# Patient Record
Sex: MALE | Race: AMERICAN INDIAN OR ALASKA NATIVE | NOT HISPANIC OR LATINO | Employment: UNEMPLOYED | URBAN - METROPOLITAN AREA
[De-identification: names, ages, dates, MRNs, and addresses within clinical notes are randomized per-mention and may not be internally consistent; named-entity substitution may affect disease eponyms.]

---

## 2017-08-07 ENCOUNTER — HOSPITAL ENCOUNTER (EMERGENCY)
Facility: MEDICAL CENTER | Age: 32
End: 2017-08-07
Attending: EMERGENCY MEDICINE
Payer: OTHER GOVERNMENT

## 2017-08-07 ENCOUNTER — APPOINTMENT (OUTPATIENT)
Dept: RADIOLOGY | Facility: MEDICAL CENTER | Age: 32
End: 2017-08-07
Attending: EMERGENCY MEDICINE
Payer: OTHER GOVERNMENT

## 2017-08-07 VITALS
WEIGHT: 181.88 LBS | OXYGEN SATURATION: 95 % | TEMPERATURE: 99.1 F | BODY MASS INDEX: 25.46 KG/M2 | RESPIRATION RATE: 18 BRPM | DIASTOLIC BLOOD PRESSURE: 77 MMHG | SYSTOLIC BLOOD PRESSURE: 143 MMHG | HEART RATE: 95 BPM | HEIGHT: 71 IN

## 2017-08-07 DIAGNOSIS — M25.561 ACUTE PAIN OF RIGHT KNEE: ICD-10-CM

## 2017-08-07 DIAGNOSIS — M25.40 EFFUSION INTO JOINT: ICD-10-CM

## 2017-08-07 LAB
APPEARANCE FLD: NORMAL
BODY FLD TYPE: NORMAL
COLOR FLD: YELLOW
CSF COMMENTS 1658: NORMAL
GRAM STN SPEC: NORMAL
LYMPHOCYTES NFR FLD: 4 %
MONONUC CELLS NFR FLD: 1 %
NEUTROPHILS NFR FLD: 95 %
RBC # FLD: 2000 CELLS/UL
SIGNIFICANT IND 70042: NORMAL
SITE SITE: NORMAL
SOURCE SOURCE: NORMAL
WBC # FLD: NORMAL CELLS/UL

## 2017-08-07 PROCEDURE — 20610 DRAIN/INJ JOINT/BURSA W/O US: CPT

## 2017-08-07 PROCEDURE — 99284 EMERGENCY DEPT VISIT MOD MDM: CPT

## 2017-08-07 PROCEDURE — 87205 SMEAR GRAM STAIN: CPT

## 2017-08-07 PROCEDURE — 700101 HCHG RX REV CODE 250: Performed by: EMERGENCY MEDICINE

## 2017-08-07 PROCEDURE — 73562 X-RAY EXAM OF KNEE 3: CPT | Mod: RT

## 2017-08-07 PROCEDURE — 89051 BODY FLUID CELL COUNT: CPT

## 2017-08-07 PROCEDURE — 87070 CULTURE OTHR SPECIMN AEROBIC: CPT

## 2017-08-07 RX ORDER — OXYCODONE HYDROCHLORIDE AND ACETAMINOPHEN 5; 325 MG/1; MG/1
1-2 TABLET ORAL EVERY 6 HOURS PRN
Qty: 10 TAB | Refills: 0 | Status: SHIPPED | OUTPATIENT
Start: 2017-08-07

## 2017-08-07 RX ORDER — LIDOCAINE HYDROCHLORIDE AND EPINEPHRINE BITARTRATE 20; .01 MG/ML; MG/ML
10 INJECTION, SOLUTION SUBCUTANEOUS ONCE
Status: COMPLETED | OUTPATIENT
Start: 2017-08-07 | End: 2017-08-07

## 2017-08-07 RX ADMIN — LIDOCAINE HYDROCHLORIDE AND EPINEPHRINE 10 ML: 20; 10 INJECTION, SOLUTION INFILTRATION; PERINEURAL at 14:00

## 2017-08-07 ASSESSMENT — PAIN SCALES - GENERAL: PAINLEVEL_OUTOF10: 10

## 2017-08-07 NOTE — ED AVS SNAPSHOT
Home Care Instructions                                                                                                                Haim NUNO [place]   MRN: [id number]    Department:  [facility], Emergency Dept   Date of Visit:  8/7/2017            [facility], Emergency Dept    [address]    [place] [address]    Phone:  [phone]      You were seen by     Jared Silva M.D.      Your Diagnosis Was     Acute pain of right knee     M25.561       Follow-up Information     1. Follow up with Judah Craig M.D.. Schedule an appointment as soon as possible for a visit in 1 week.    Specialty:  Orthopaedics    Contact information    [address]  [address]  [facility] [address]  [phone]        Medication Information     Review all of your home medications and newly ordered medications with your primary doctor and/or pharmacist as soon as possible. Follow medication instructions as directed by your doctor and/or pharmacist.     Please keep your complete medication list with you and share with your physician. Update the information when medications are discontinued, doses are changed, or new medications (including over-the-counter products) are added; and carry medication information at all times in the event of emergency situations.               Medication List      START taking these medications        Instructions    Morning Afternoon Evening Bedtime    oxycodone-acetaminophen 5-325 MG Tabs   Commonly known as:  PERCOCET        Take 1-2 Tabs by mouth every 6 hours as needed (moderate to severe pain).   Dose:  1-2 Tab                             Where to Get Your Medications      You can get these medications from any pharmacy     Bring a paper prescription for each of these medications    - oxycodone-acetaminophen 5-325 MG Tabs            Procedures and tests performed during your visit     Procedure/Test Number of Times Performed    CONSENT FOR NON-SURGERY W/O SED 1    DX-KNEE 3 VIEWS RIGHT 1    NURSING COMMUNICATION 2    ORTHOPEDIC TECHNICIAN ASSISTANCE 1        Discharge Instructions       Knee Effusion  You probably have a cruciate ligament injury or a meniscus injury. Rest and ice the knee. Wear knee immobilizer and use crutches. Take ibuprofen and Percocet for pain. Follow-up with Dr. Craig within the week.    Knee effusion means that you have excess fluid in your knee joint. This can cause pain and swelling in your knee. This may make your knee more difficult to bend and move. That is because there is increased pain and pressure in the joint. If there is fluid in your knee, it often means that something is wrong inside your knee, such as severe arthritis, abnormal inflammation, or an infection. Another common cause of knee effusion is an injury to the knee muscles, ligaments, or cartilage.  HOME CARE INSTRUCTIONS  · Use crutches as directed by your health care provider.  · Wear a knee brace as directed by your health care provider.  · Apply ice to the swollen area:  ¨ Put ice in a plastic bag.  ¨ Place a towel between your skin and the bag.  ¨ Leave the ice on for 20 minutes, 2-3 times per day.  · Keep your knee raised (elevated) when you are sitting or lying down.  · Take medicines only as directed by your health care provider.  · Do any rehabilitation or strengthening exercises as directed by your health care provider.  · Rest your knee as directed by your health care provider. You may start doing your normal activities again when your health care provider approves.     · Keep all follow-up visits as directed by your health care provider. This is important.  SEEK MEDICAL CARE IF:  · You have ongoing (persistent) pain in your knee.  SEEK IMMEDIATE MEDICAL CARE IF:  · You have increased swelling or redness of your knee.  · You have severe pain in your knee.  · You have a fever.     This information is not intended to replace advice given to you by your health care provider.  Make sure you discuss any questions you have with your health care provider.     Document Released: 03/09/2005 Document Revised: 01/08/2016 Document Reviewed: 08/03/2015  Elsevier Interactive Patient Education ©2016 Elsevier Inc.            Patient Information     Patient Information    Following emergency treatment: all patient requiring follow-up care must return either to a private physician or a clinic if your condition worsens before you are able to obtain further medical attention, please return to the emergency room.     Billing Information    At [facility], we work to make the billing process streamlined for our patients.  Our Representatives are here to answer any questions you may have regarding your hospital bill.  If you have insurance coverage and have supplied your insurance information to us, we will submit a claim to your insurer on your behalf.  Should you have any questions regarding your bill, we can be reached online or by phone as follows:  Online: You are able pay your bills online or live chat with our representatives about any billing questions you may have. We are here to help Monday - Friday from 8:00am to 7:30pm and 9:00am - 12:00pm on Saturdays.  Please visit [email or web address]  for more information.   Phone:  [phone] or [phone]    Please note that your emergency physician, surgeon, pathologist, radiologist, anesthesiologist, and other specialists are not employed by [facility] and will therefore bill separately for their services.  Please contact them directly for any questions concerning their bills at the numbers below:     Emergency Physician Services:  [phone]  [facility]:  [phone]  [facility]:  [phone]  [facility]:  [phone]    1. Your final bill may vary from the amount quoted upon discharge if all procedures are not complete at that time, or if your doctor has  additional procedures of which we are not aware. You will receive an additional bill if you return to the Emergency Department at [facility] for suture removal regardless of the facility of which the sutures were placed.     2. Please arrange for settlement of this account at the emergency registration.    3. All self-pay accounts are due in full at the time of treatment.  If you are unable to meet this obligation then payment is expected within 4-5 days.     4. If you have had radiology studies (CT, X-ray, Ultrasound, MRI), you have received a preliminary result during your emergency department visit. Please contact the radiology department [phone] to receive a copy of your final result. Please discuss the Final result with your primary physician or with the follow up physician provided.     Crisis Hotline:  [place] Crisis Hotline:  1-800-ZAIMIFY or 1-536.346.9327  [place] Crisis Hotline:    1-569.538.1088 or 199-993-9563         ED Discharge Follow Up Questions    1. In order to provide you with very good care, we would like to follow up with a phone call in the next few days.  May we have your permission to contact you?     YES /  NO    2. What is the best phone number to call you? (       )_____-__________    3. What is the best time to call you?      Morning  /  Afternoon  /  Evening                   Patient Signature:  ____________________________________________________________    Date:  ____________________________________________________________

## 2017-08-07 NOTE — ED NOTES
Pt to triage in wheelchair  Chief Complaint   Patient presents with   • Knee Swelling     R knee    states he twisted knee coming down stairs 3-4 days ago  Pt asked to wait in lobby, pt updated on triage process and pt asked to inform RN of any changes.

## 2017-08-07 NOTE — DISCHARGE INSTRUCTIONS
Knee Effusion  You probably have a cruciate ligament injury or a meniscus injury. Rest and ice the knee. Wear knee immobilizer and use crutches. Take ibuprofen and Percocet for pain. Follow-up with Dr. Craig within the week.    Knee effusion means that you have excess fluid in your knee joint. This can cause pain and swelling in your knee. This may make your knee more difficult to bend and move. That is because there is increased pain and pressure in the joint. If there is fluid in your knee, it often means that something is wrong inside your knee, such as severe arthritis, abnormal inflammation, or an infection. Another common cause of knee effusion is an injury to the knee muscles, ligaments, or cartilage.  HOME CARE INSTRUCTIONS  · Use crutches as directed by your health care provider.  · Wear a knee brace as directed by your health care provider.  · Apply ice to the swollen area:  ¨ Put ice in a plastic bag.  ¨ Place a towel between your skin and the bag.  ¨ Leave the ice on for 20 minutes, 2-3 times per day.  · Keep your knee raised (elevated) when you are sitting or lying down.  · Take medicines only as directed by your health care provider.  · Do any rehabilitation or strengthening exercises as directed by your health care provider.  · Rest your knee as directed by your health care provider. You may start doing your normal activities again when your health care provider approves.     · Keep all follow-up visits as directed by your health care provider. This is important.  SEEK MEDICAL CARE IF:  · You have ongoing (persistent) pain in your knee.  SEEK IMMEDIATE MEDICAL CARE IF:  · You have increased swelling or redness of your knee.  · You have severe pain in your knee.  · You have a fever.     This information is not intended to replace advice given to you by your health care provider. Make sure you discuss any questions you have with your health care provider.     Document Released: 03/09/2005 Document  Revised: 01/08/2016 Document Reviewed: 08/03/2015  Elsevier Interactive Patient Education ©2016 Elsevier Inc.

## 2017-08-07 NOTE — ED AVS SNAPSHOT
Neurodyn Access Code: [email or web address]  Expires: 9/6/2017  1:44 PM    Neurodyn  A secure, online tool to manage your health information     [employer]’s Neurodyn® is a secure, online tool that connects you to your personalized health information from the privacy of your home -- day or night - making it very easy for you to manage your healthcare. Once the activation process is completed, you can even access your medical information using the Neurodyn guanaco, which is available for free in the Apple Guanaco store or Google Play store.     Neurodyn provides the following levels of access (as shown below):   My Chart Features   [facility] Primary Care Doctor [facility]  Specialists [facility]  Urgent  Care Non-[facility]  Primary Care  Doctor   Email your healthcare team securely and privately 24/7 X X X X   Manage appointments: schedule your next appointment; view details of past/upcoming appointments X      Request prescription refills. X      View recent personal medical records, including lab and immunizations X X X X   View health record, including health history, allergies, medications X X X X   Read reports about your outpatient visits, procedures, consult and ER notes X X X X   See your discharge summary, which is a recap of your hospital and/or ER visit that includes your diagnosis, lab results, and care plan. X X       How to register for Neurodyn:  1. Go to  [email or web address].  2. Click on the Sign Up Now box, which takes you to the New Member Sign Up page. You will need to provide the following information:  a. Enter your Neurodyn Access Code exactly as it appears at the top of this page. (You will not need to use this code after you’ve completed the sign-up process. If you do not sign up before the expiration date, you must request a new code.)   b. Enter your date of birth.   c. Enter your home email address.   d. Click Submit, and follow the next screen’s instructions.  3. Create a Neurodyn ID. This will be your Neurodyn  login ID and cannot be changed, so think of one that is secure and easy to remember.  4. Create a Reedsy password. You can change your password at any time.  5. Enter your Password Reset Question and Answer. This can be used at a later time if you forget your password.   6. Enter your e-mail address. This allows you to receive e-mail notifications when new information is available in Reedsy.  7. Click Sign Up. You can now view your health information.    For assistance activating your Reedsy account, call [phone]

## 2017-08-07 NOTE — ED PROVIDER NOTES
"ED Provider Note    CHIEF COMPLAINT   Chief Complaint   Patient presents with   • Knee Swelling     R knee        HPI   Haim Holl[place] is a 31 y.o. male who presents with severe right knee pain and swelling and decreased range of motion since a twist injury walking on the stairs 3 days ago. No prior injury.    REVIEW OF SYSTEMS   Pertinent positives: Right knee pain swelling and decreased range of motion.  Pertinent negatives: Fever, redness.    PAST MEDICAL HISTORY   Denies    CURRENT MEDICATIONS   None.    ALLERGIES   No Known Allergies    PHYSICAL EXAM  VITAL SIGNS: /77 mmHg  Pulse 95  Temp(Src) 37.3 °C (99.1 °F) (Temporal)  Resp 18  Ht 1.803 m (5' 11\")  Wt 82.5 kg (181 lb 14.1 oz)  BMI 25.38 kg/m2  SpO2 95%  Constitutional: Well developed, Well nourished, elevated blood pressure.  HENT: Atraumatic.   Cardiovascular:  Peripheral pulses sounds pedis 2+.  Skin: No erythema or warmth.   Musculoskeletal: Large right knee effusion and patient refuses flexion and extension. Held in 15° of flexion. Radial and lateral joint line tenderness. No deformity  Compartments: Soft  Neurologic: Digit flexion-extension and sensation normal    RADIOLOGY/PROCEDURES  DX-KNEE 3 VIEWS RIGHT   Final Result      1.  There is a moderate right knee joint effusion with superficial swelling. There is no fracture or dislocation.        Case discussed with Dr. Craig    COURSE & MEDICAL DECISION MAKING  Seizure: Therapeutic arthrocentesis. Informed consent obtained. Knee prepped with povidone. Sterile gloves and mask utilized. Sterile drape placed. Skin anesthetized with 1-1/2 mL 1% lidocaine with epinephrine. 60 mL of thick yellow synovial fluid removed. Standard sterile technique employed. Patient tolerated procedure well. Cell count and culture pending.    This patient presents with a painful posttraumatic effusion and likely has a meniscus injury versus an ACL injury. On ligament stress testing there is no evidence of " collateral ligament, posterior cruciate injury or definite ACL injury.    PLAN:  Knee immobilizer  Crutches  [place]  Hydrocodone  Prescription monitoring accessed  Follow-up Dr. Craig    CONDITION: good    FINAL IMPRESSION  1. Acute pain of right knee    2. Effusion into joint            Electronically signed by: Jared Silva, 8/7/2017 11:15 AM

## 2017-08-07 NOTE — ED NOTES
Knee immobilizer applied, crutch instruction provided. PT demonstrates use. Verbalizes understanding of percocet prescription and follow up. Denies questions/needs at this time.

## 2017-08-07 NOTE — ED AVS SNAPSHOT
8/7/2017    Haim NUNO [place]  [address] [place] [address]    Dear Haim:    [facility] wants to ensure your discharge home is safe and you or your loved ones have had all of your questions answered regarding your care after you leave the hospital.    Below is a list of resources and contact information should you have any questions regarding your hospital stay, follow-up instructions, or active medical symptoms.    Questions or Concerns Regarding… Contact   Medical Questions Related to Your Discharge  (7 days a week, 8am-5pm) Contact a Nurse Care Coordinator   [phone]   Medical Questions Not Related to Your Discharge  (24 hours a day / 7 days a week)  Contact the Nurse Health Line   [phone]    Medications or Discharge Instructions Refer to your discharge packet   or contact your [facility] Primary Care Provider   [phone]   Follow-up Appointment(s) Schedule your appointment via Shotfarm   or contact Scheduling [phone]   Billing Review your statement via Shotfarm  or contact Billing [phone]   Medical Records Review your records via Shotfarm   or contact Medical Records [phone]     You may receive a telephone call within two days of discharge. This call is to make certain you understand your discharge instructions and have the opportunity to have any questions answered. You can also easily access your medical information, test results and upcoming appointments via the Shotfarm free online health management tool. You can learn more and sign up at [email or web address]. For assistance setting up your Shotfarm account, please call [phone].    Once again, we want to ensure your discharge home is safe and that you have a clear understanding of any next steps in your care. If you have any questions or concerns, please do not hesitate to contact us, we are here for you. Thank you for choosing [facility] for your healthcare needs.    Sincerely,    Your [facility] Healthcare Team

## 2017-08-11 LAB
BACTERIA FLD AEROBE CULT: NORMAL
GRAM STN SPEC: NORMAL
SIGNIFICANT IND 70042: NORMAL
SITE SITE: NORMAL
SOURCE SOURCE: NORMAL

## 2017-08-28 ENCOUNTER — APPOINTMENT (OUTPATIENT)
Dept: RADIOLOGY | Facility: MEDICAL CENTER | Age: 32
End: 2017-08-28
Attending: EMERGENCY MEDICINE
Payer: OTHER GOVERNMENT

## 2017-08-28 ENCOUNTER — HOSPITAL ENCOUNTER (EMERGENCY)
Facility: MEDICAL CENTER | Age: 32
End: 2017-08-28
Attending: EMERGENCY MEDICINE
Payer: OTHER GOVERNMENT

## 2017-08-28 VITALS
RESPIRATION RATE: 20 BRPM | WEIGHT: 181 LBS | DIASTOLIC BLOOD PRESSURE: 90 MMHG | BODY MASS INDEX: 24.52 KG/M2 | HEIGHT: 72 IN | OXYGEN SATURATION: 95 % | TEMPERATURE: 96.3 F | SYSTOLIC BLOOD PRESSURE: 127 MMHG | HEART RATE: 94 BPM

## 2017-08-28 DIAGNOSIS — L03.115 CELLULITIS OF RIGHT LOWER EXTREMITY: ICD-10-CM

## 2017-08-28 DIAGNOSIS — S89.91XA KNEE INJURY, RIGHT, INITIAL ENCOUNTER: ICD-10-CM

## 2017-08-28 DIAGNOSIS — M71.21 BAKER'S CYST, RIGHT: ICD-10-CM

## 2017-08-28 DIAGNOSIS — F10.10 ALCOHOL ABUSE: ICD-10-CM

## 2017-08-28 LAB
ANION GAP SERPL CALC-SCNC: 10 MMOL/L (ref 0–11.9)
BASOPHILS # BLD AUTO: 0.4 % (ref 0–1.8)
BASOPHILS # BLD: 0.02 K/UL (ref 0–0.12)
BUN SERPL-MCNC: 5 MG/DL (ref 8–22)
CALCIUM SERPL-MCNC: 8.6 MG/DL (ref 8.5–10.5)
CHLORIDE SERPL-SCNC: 109 MMOL/L (ref 96–112)
CO2 SERPL-SCNC: 23 MMOL/L (ref 20–33)
CREAT SERPL-MCNC: 0.52 MG/DL (ref 0.5–1.4)
CRP SERPL HS-MCNC: 4.38 MG/DL (ref 0–0.75)
EOSINOPHIL # BLD AUTO: 0.24 K/UL (ref 0–0.51)
EOSINOPHIL NFR BLD: 4.2 % (ref 0–6.9)
ERYTHROCYTE [DISTWIDTH] IN BLOOD BY AUTOMATED COUNT: 44.8 FL (ref 35.9–50)
GFR SERPL CREATININE-BSD FRML MDRD: >60 ML/MIN/1.73 M 2
GLUCOSE SERPL-MCNC: 88 MG/DL (ref 65–99)
HCT VFR BLD AUTO: 43.4 % (ref 42–52)
HGB BLD-MCNC: 14.5 G/DL (ref 14–18)
IMM GRANULOCYTES # BLD AUTO: 0.01 K/UL (ref 0–0.11)
IMM GRANULOCYTES NFR BLD AUTO: 0.2 % (ref 0–0.9)
LYMPHOCYTES # BLD AUTO: 1.64 K/UL (ref 1–4.8)
LYMPHOCYTES NFR BLD: 28.8 % (ref 22–41)
MCH RBC QN AUTO: 28.8 PG (ref 27–33)
MCHC RBC AUTO-ENTMCNC: 33.4 G/DL (ref 33.7–35.3)
MCV RBC AUTO: 86.3 FL (ref 81.4–97.8)
MONOCYTES # BLD AUTO: 0.44 K/UL (ref 0–0.85)
MONOCYTES NFR BLD AUTO: 7.7 % (ref 0–13.4)
NEUTROPHILS # BLD AUTO: 3.34 K/UL (ref 1.82–7.42)
NEUTROPHILS NFR BLD: 58.7 % (ref 44–72)
NRBC # BLD AUTO: 0 K/UL
NRBC BLD AUTO-RTO: 0 /100 WBC
PLATELET # BLD AUTO: 368 K/UL (ref 164–446)
PMV BLD AUTO: 9.5 FL (ref 9–12.9)
POTASSIUM SERPL-SCNC: 2.9 MMOL/L (ref 3.6–5.5)
RBC # BLD AUTO: 5.03 M/UL (ref 4.7–6.1)
SODIUM SERPL-SCNC: 142 MMOL/L (ref 135–145)
WBC # BLD AUTO: 5.7 K/UL (ref 4.8–10.8)

## 2017-08-28 PROCEDURE — 73590 X-RAY EXAM OF LOWER LEG: CPT | Mod: RT

## 2017-08-28 PROCEDURE — 86140 C-REACTIVE PROTEIN: CPT

## 2017-08-28 PROCEDURE — 73564 X-RAY EXAM KNEE 4 OR MORE: CPT | Mod: RT

## 2017-08-28 PROCEDURE — 85025 COMPLETE CBC W/AUTO DIFF WBC: CPT

## 2017-08-28 PROCEDURE — 99283 EMERGENCY DEPT VISIT LOW MDM: CPT

## 2017-08-28 PROCEDURE — 80048 BASIC METABOLIC PNL TOTAL CA: CPT

## 2017-08-28 PROCEDURE — A9270 NON-COVERED ITEM OR SERVICE: HCPCS | Performed by: EMERGENCY MEDICINE

## 2017-08-28 PROCEDURE — 93971 EXTREMITY STUDY: CPT

## 2017-08-28 PROCEDURE — 700102 HCHG RX REV CODE 250 W/ 637 OVERRIDE(OP): Performed by: EMERGENCY MEDICINE

## 2017-08-28 RX ORDER — IBUPROFEN 600 MG/1
600 TABLET ORAL ONCE
Status: COMPLETED | OUTPATIENT
Start: 2017-08-28 | End: 2017-08-28

## 2017-08-28 RX ORDER — CEPHALEXIN 500 MG/1
500 CAPSULE ORAL 4 TIMES DAILY
Qty: 28 CAP | Refills: 0 | Status: SHIPPED | OUTPATIENT
Start: 2017-08-28 | End: 2017-09-04

## 2017-08-28 RX ADMIN — IBUPROFEN 600 MG: 600 TABLET, FILM COATED ORAL at 06:42

## 2017-08-28 ASSESSMENT — LIFESTYLE VARIABLES
EVER HAD A DRINK FIRST THING IN THE MORNING TO STEADY YOUR NERVES TO GET RID OF A HANGOVER: NO
HAVE YOU EVER FELT YOU SHOULD CUT DOWN ON YOUR DRINKING: NO
TOTAL SCORE: 0
HAVE PEOPLE ANNOYED YOU BY CRITICIZING YOUR DRINKING: NO
TOTAL SCORE: 0
TOTAL SCORE: 0
EVER FELT BAD OR GUILTY ABOUT YOUR DRINKING: NO
CONSUMPTION TOTAL: INCOMPLETE
DO YOU DRINK ALCOHOL: YES

## 2017-08-28 ASSESSMENT — PAIN SCALES - GENERAL: PAINLEVEL_OUTOF10: 10

## 2017-08-28 NOTE — ED NOTES
Lab called and are backed up and will be placing patient's labs in process as soon as possible and ERP updated.

## 2017-08-28 NOTE — ED NOTES
Patient given discharge paperwork and verbalized understanding. Patient out of ED ambulatory with police. Patient in stable condition and vitals stable and no distress noted.

## 2017-08-28 NOTE — ED NOTES
Assumed care of patient. Patient complains of right leg pain x days and patient got arrested tonight so wanted to come to ED. Patient claims that if he wasn't arrested tonight he was going to see his Ortho MD on Tuesday but due to being arrested he won't be able to go.  Patient alert and oriented x 4. Patient denies any other complaints at this time. Patient side rails up x 2 and call bell within reach. Patient in custody with Mariam WARD.

## 2017-08-28 NOTE — ED PROVIDER NOTES
ED Provider Note      CHIEF COMPLAINT   Chief Complaint   Patient presents with   • Leg Pain     Patient complains of right leg pain after being arrested tonight with redness and swelling noted.       DOROTA Holliday is a 31 y.o. male who presentsTo the emergency department over the right leg pain and swelling. Patient was seen on 8/7 with knee pain after twisting it. He had a large effusion. Effusion was drained. On review of the chart culture negative. Orthopedics was consulted and the patient was advised follow-up. He has not followed up because of insurance, but says he was told that he needed to come to the ER if his leg continued to hurt. Since then his whole leg swelled up. He doesn't recall any new injury. He has not had a fever. Has not noticed any skin rash. Tonight he was being arrested and complained of the leg pain. He was brought into the ER for medical clearance.    REVIEW OF SYSTEMS   Pertinent negative: No fever, new trauma, injection drug use    PAST MEDICAL HISTORY   Past Medical History:   Diagnosis Date   • Abscess        SOCIAL HISTORY  Social History   Substance Use Topics   • Smoking status: Current Some Day Smoker   • Smokeless tobacco: Never Used   • Alcohol use Yes      Comment: Whiskey more than 2x/wk       ALLERGIES   See chart    PHYSICAL EXAM  VITAL SIGNS: /90   Pulse 94   Temp (!) 35.7 °C (96.3 °F)   Resp 20   Ht 1.829 m (6')   Wt 82.1 kg (181 lb)   SpO2 95%   BMI 24.55 kg/m²   Head: Atraumatic  Eyes: Eyes normal inspection  Neck: has full range of motion, normal inspection.  Constitutional: No acute distress   Cardiovascular: Normal heart rate. PulsesStrong dorsalis pedis  Thorax & Lungs: No respiratory distress  Skin: There is mild redness over the right lower leg above the sock line  Musculoskeletal: There is edema of the right lower extremity from the knee down. There is a right knee effusion. No pain with range of motion the limited plane, but full extension  and significant flexion results in discomfort. He resists assessment of ligaments because of pain. The entire right lower extremity is swollen. There is no significant warmth. He does not have any palpable cords. Compartments soft.  Neurologic:  Normal sensory and motor    RADIOLOGY/PROCEDURES  LE VENOUS DUPLEX (Specify in Comments Left, Right Or Bilateral)         DX-TIBIA AND FIBULA RIGHT   Final Result      No acute fracture is identified.      DX-KNEE COMPLETE 4+ RIGHT   Final Result      1.  No acute fracture is identified.      2.  Small knee effusion, decreased from the prior exam         Imaging is interpreted by radiologist    Venous duplex interpreted as negative for DVT, but fluid collection in the popliteal region was noted consistent with Baker cyst      Results for orders placed or performed during the hospital encounter of 08/28/17   CBC WITH DIFFERENTIAL   Result Value Ref Range    WBC 5.7 4.8 - 10.8 K/uL    RBC 5.03 4.70 - 6.10 M/uL    Hemoglobin 14.5 14.0 - 18.0 g/dL    Hematocrit 43.4 42.0 - 52.0 %    MCV 86.3 81.4 - 97.8 fL    MCH 28.8 27.0 - 33.0 pg    MCHC 33.4 (L) 33.7 - 35.3 g/dL    RDW 44.8 35.9 - 50.0 fL    Platelet Count 368 164 - 446 K/uL    MPV 9.5 9.0 - 12.9 fL    Neutrophils-Polys 58.70 44.00 - 72.00 %    Lymphocytes 28.80 22.00 - 41.00 %    Monocytes 7.70 0.00 - 13.40 %    Eosinophils 4.20 0.00 - 6.90 %    Basophils 0.40 0.00 - 1.80 %    Immature Granulocytes 0.20 0.00 - 0.90 %    Nucleated RBC 0.00 /100 WBC    Neutrophils (Absolute) 3.34 1.82 - 7.42 K/uL    Lymphs (Absolute) 1.64 1.00 - 4.80 K/uL    Monos (Absolute) 0.44 0.00 - 0.85 K/uL    Eos (Absolute) 0.24 0.00 - 0.51 K/uL    Baso (Absolute) 0.02 0.00 - 0.12 K/uL    Immature Granulocytes (abs) 0.01 0.00 - 0.11 K/uL    NRBC (Absolute) 0.00 K/uL   CRP QUANTITIVE (NON-CARDIAC)   Result Value Ref Range    Stat C-Reactive Protein 4.38 (H) 0.00 - 0.75 mg/dL   BMP   Result Value Ref Range    Sodium 142 135 - 145 mmol/L    Potassium 2.9  (L) 3.6 - 5.5 mmol/L    Chloride 109 96 - 112 mmol/L    Co2 23 20 - 33 mmol/L    Glucose 88 65 - 99 mg/dL    Bun 5 (L) 8 - 22 mg/dL    Creatinine 0.52 0.50 - 1.40 mg/dL    Calcium 8.6 8.5 - 10.5 mg/dL    Anion Gap 10.0 0.0 - 11.9   ESTIMATED GFR   Result Value Ref Range    GFR If African American >60 >60 mL/min/1.73 m 2    GFR If Non African American >60 >60 mL/min/1.73 m 2         COURSE & MEDICAL DECISION MAKING  Patient presents to the ER with right lower extremity pain. This is an ongoing issue. He sustained a knee injury previously that he has not had addressed. His whole right leg is swollen. He does have some scant redness. He does not have an exam consistent with septic arthritis. Further he had arthrocentesis a few weeks ago that was negative. I obtained repeat x-rays which were negative. Ultrasound of the right lower extremity was also negative for DVT. I obtained laboratory data without leukocytosis, but he does have elevation of the CRP. Given the redness of the skin I will cover him for possible cellulitis. He is prescribed Keflex. His potassium was low. Given instructions regarding potassium replenishment. I have advised that he follow-up at the orthopedic clinic as soon as possible. He should return to the ER if he has fevers, worsening symptoms, not improving or concern.    Patient advised to see a primary provider for blood pressure management    FINAL IMPRESSION  1. Internal derangement with associated effusion, right knee  2. Right lower extremity edema, suspected ruptured Baker's cyst versus cyst  3. Possible cellulitis right lower extremity  4. Hypokalemia      This dictation was created using voice recognition software. The accuracy of the dictation is limited to the abilities of the software. I expect there may be some errors of grammar and possibly content. The nursing notes were reviewed and certain aspects of this information were incorporated into this note.      Electronically signed by:  Alex Schulte, 8/28/2017 11:37 AM

## 2017-09-25 ENCOUNTER — HOSPITAL ENCOUNTER (EMERGENCY)
Facility: MEDICAL CENTER | Age: 32
End: 2017-09-25
Attending: EMERGENCY MEDICINE
Payer: MEDICAID

## 2017-09-25 VITALS
DIASTOLIC BLOOD PRESSURE: 84 MMHG | WEIGHT: 194 LBS | HEIGHT: 72 IN | SYSTOLIC BLOOD PRESSURE: 133 MMHG | RESPIRATION RATE: 16 BRPM | BODY MASS INDEX: 26.28 KG/M2 | HEART RATE: 98 BPM | OXYGEN SATURATION: 100 % | TEMPERATURE: 98 F

## 2017-09-25 DIAGNOSIS — H11.31 SUBCONJUNCTIVAL HEMORRHAGE, RIGHT: ICD-10-CM

## 2017-09-25 DIAGNOSIS — S05.01XA CORNEAL ABRASION, RIGHT, INITIAL ENCOUNTER: ICD-10-CM

## 2017-09-25 PROCEDURE — 99283 EMERGENCY DEPT VISIT LOW MDM: CPT

## 2017-09-25 ASSESSMENT — LIFESTYLE VARIABLES
DO YOU DRINK ALCOHOL: YES
CONSUMPTION TOTAL: INCOMPLETE
TOTAL SCORE: 0
EVER FELT BAD OR GUILTY ABOUT YOUR DRINKING: NO
HAVE PEOPLE ANNOYED YOU BY CRITICIZING YOUR DRINKING: NO
HAVE YOU EVER FELT YOU SHOULD CUT DOWN ON YOUR DRINKING: NO
TOTAL SCORE: 0
EVER HAD A DRINK FIRST THING IN THE MORNING TO STEADY YOUR NERVES TO GET RID OF A HANGOVER: NO
TOTAL SCORE: 0

## 2017-09-26 NOTE — ED NOTES
Assumed care of patient. Patient complains of foreign body in right eye x today.  Patient alert and oriented x 4. Patient denies any other complaints at this time. Call bell within reach.

## 2017-09-26 NOTE — ED NOTES
Haim S Lakota 31 y.o. male   Ambulatory to triage    Chief Complaint   Patient presents with   • Foreign Body in Eye     R eye.  Possible insulation.  Redness noted to R eye.        Pt returned to lobby and educated on triage process.  Advised to notify RN with changes or concerns.

## 2017-09-26 NOTE — ED NOTES
Patient ambulatory from [facility] to [facility]. Steady gait. Chart up for ERP.

## 2017-09-26 NOTE — DISCHARGE INSTRUCTIONS
Use the ointment in the right eye 4 times a day for a week approximately half the length of your pinky nail    Corneal Abrasion  The cornea is the clear covering at the front and center of the eye. When looking at the colored portion of the eye (iris), you are looking through the cornea. This very thin tissue is made up of many layers. The surface layer is a single layer of cells (corneal epithelium) and is one of the most sensitive tissues in the body. If a scratch or injury causes the corneal epithelium to come off, it is called a corneal abrasion. If the injury extends to the tissues below the epithelium, the condition is called a corneal ulcer.  CAUSES   · Scratches.  · Trauma.  · Foreign body in the eye.  Some people have recurrences of abrasions in the area of the original injury even after it has healed (recurrent erosion syndrome). Recurrent erosion syndrome generally improves and goes away with time.  SYMPTOMS   · Eye pain.  · Difficulty or inability to keep the injured eye open.  · The eye becomes very sensitive to light.  · Recurrent erosions tend to happen suddenly, first thing in the morning, usually after waking up and opening the eye.  DIAGNOSIS   Your health care provider can diagnose a corneal abrasion during an eye exam. Dye is usually placed in the eye using a drop or a small paper strip moistened by your tears. When the eye is examined with a special light, the abrasion shows up clearly because of the dye.  TREATMENT   · Small abrasions may be treated with antibiotic drops or ointment alone.  · A pressure patch may be put over the eye. If this is done, follow your doctor's instructions for when to remove the patch. Do not drive or use machines while the eye patch is on. Judging distances is hard to do with a patch on.  If the abrasion becomes infected and spreads to the deeper tissues of the cornea, a corneal ulcer can result. This is serious because it can cause corneal scarring. Corneal scars  interfere with light passing through the cornea and cause a loss of vision in the involved eye.  HOME CARE INSTRUCTIONS  · Use medicine or ointment as directed. Only take over-the-counter or prescription medicines for pain, discomfort, or fever as directed by your health care provider.  · Do not drive or operate machinery if your eye is patched. Your ability to  distances is impaired.  · If your health care provider has given you a follow-up appointment, it is very important to keep that appointment. Not keeping the appointment could result in a severe eye infection or permanent loss of vision. If there is any problem keeping the appointment, let your health care provider know.  SEEK MEDICAL CARE IF:   · You have pain, light sensitivity, and a scratchy feeling in one eye or both eyes.  · Your pressure patch keeps loosening up, and you can blink your eye under the patch after treatment.  · Any kind of discharge develops from the eye after treatment or if the lids stick together in the morning.  · You have the same symptoms in the morning as you did with the original abrasion days, weeks, or months after the abrasion healed.  MAKE SURE YOU:   · Understand these instructions.  · Will watch your condition.  · Will get help right away if you are not doing well or get worse.     This information is not intended to replace advice given to you by your health care provider. Make sure you discuss any questions you have with your health care provider.     Document Released: 12/15/2001 Document Revised: 12/23/2014 Document Reviewed: 08/25/2014  [employer] Interactive Patient Education ©2016 [employer]

## 2017-09-26 NOTE — ED PROVIDER NOTES
ED Provider Note    CHIEF COMPLAINT  Chief Complaint   Patient presents with   • Foreign Body in Eye     R eye.  Possible insulation.  Redness noted to R eye.       HPI  Haim Holliday is a 31 y.o. male who presents To the emergency department with chief complaint of right-sided eye irritation and discomfort and blurry vision over the last 36 hours. 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 He does have this itching type feeling however he did notice blood on the side of his eye as well this morning it seemed to some lightly worsened throughout the day, he does endorse these been sneezing quite a lot secondary to working with insulation. He is otherwise healthy he is not on any blood thinning medications has no history of liver dysfunction used to drink quite frequently but quit or backed off several years ago. Otherwise denies headache nausea vomiting or trauma to the eye    REVIEW OF SYSTEMS  See HPI for further details. All other systems are negative.     PAST MEDICAL HISTORY   has a past medical history of Abscess.    SOCIAL HISTORY  Social History     Social History Main Topics   • Smoking status: Current Some Day Smoker   • Smokeless tobacco: Never Used   • Alcohol use Yes      Comment: Whiskey more than 2x/wk   • Drug use: No   • Sexual activity: Not on file       SURGICAL HISTORY  patient denies any surgical history    CURRENT MEDICATIONS  Home Medications    **Home medications have not yet been reviewed for this encounter**         ALLERGIES  No Known Allergies    PHYSICAL EXAM  VITAL SIGNS: /84   Pulse 98   Temp 36.7 °C (98 °F) (Temporal)   Resp 16   Ht 1.829 m (6')   Wt 88 kg (194 lb 0.1 oz)   SpO2 100%   BMI 26.31 kg/m²   Pulse ox interpretation: I interpret this pulse ox as normal.  Constitutional: Alert in no apparent distress.  HENT: Normocephalic, Atraumatic  Eyes: PERRL.Subconjunctival hemorrhage on the  right lateral aspect of the right eye very small  Slit lamp examination with fluorescein multiple areas of uptake over the cornea consistent with superficial abrasions no ulcerations, no hypopyon and no hyphema no cell and flare anterior chamber within normal limits otherwise - R eye  Heart: Regular rate and rythm, no murmurs.    Lungs: Clear to auscultation bilaterally. No resp distress, breath sounds equal  Abdomen: Non-tender, non-distended, normal bowel sounds  Skin: Warm, Dry, No erythema, No rash.   Neurologic: Alert and oriented, Grossly non-focal.       Visual acuity  OS (Left Eye)  20/10          OD (Right Eye)  20/25         OU (Both Eyes)   20/10         COURSE & MEDICAL DECISION MAKING  Pertinent Labs & Imaging studies reviewed. (See chart for details)    Discussed with the patient evidence of subconjunctival hematoma which will improve on its own as well as a larger corneal abrasion to the mid cornea likely secondary to small particle injury without evidence of foreign body or ulceration. The patient's tetanus is up-to-date he was given erythromycin ointment I discussed how to use it 4 times a day over the next week and to follow-up with ophthalmology further evaluation work goggles at work. He understands feels comfortable going home    The patient will return for new or worsening symptoms and is stable at the time of discharge.    The patient is referred to a primary physician for blood pressure management, diabetic screening, and for all other preventative health concerns.    DISPOSITION:  Patient will be discharged home in stable condition.    FOLLOW UP:  Bunny Xiong M.D.  [address]  [address]  [facility] [address]  [phone]    Schedule an appointment as soon as possible for a visit      [facility], Emergency Dept  [address] [facility] [address] [phone]    If symptoms worsen - facial swelling or redness    [address]  [address]  [phone]    for blood pressure reassessment      OUTPATIENT MEDICATIONS:  New Prescriptions    No medications on file           FINAL IMPRESSION  1. Corneal abrasion, right, initial encounter    2. Subconjunctival hemorrhage, right          Electronically signed by: Ramila Adair, 9/25/2017 10:51 PM    This dictation has been created using voice recognition software and/or scribes. The accuracy of the dictation is limited by the abilities of the software and the expertise of the scribes. I expect there may be some errors of grammar and possibly content. I made every attempt to manually correct the errors within my dictation. However, errors related to voice recognition software and/or scribes may still exist and should be interpreted within the appropriate context.

## 2018-03-24 ENCOUNTER — APPOINTMENT (OUTPATIENT)
Dept: RADIOLOGY | Facility: MEDICAL CENTER | Age: 33
End: 2018-03-24
Attending: EMERGENCY MEDICINE
Payer: MEDICAID

## 2018-03-24 ENCOUNTER — HOSPITAL ENCOUNTER (EMERGENCY)
Facility: MEDICAL CENTER | Age: 33
End: 2018-03-24
Attending: EMERGENCY MEDICINE
Payer: MEDICAID

## 2018-03-24 VITALS
SYSTOLIC BLOOD PRESSURE: 140 MMHG | HEART RATE: 95 BPM | WEIGHT: 150 LBS | OXYGEN SATURATION: 92 % | TEMPERATURE: 98.8 F | BODY MASS INDEX: 22.22 KG/M2 | RESPIRATION RATE: 16 BRPM | HEIGHT: 69 IN | DIASTOLIC BLOOD PRESSURE: 83 MMHG

## 2018-03-24 DIAGNOSIS — J32.9 SINUSITIS, UNSPECIFIED CHRONICITY, UNSPECIFIED LOCATION: ICD-10-CM

## 2018-03-24 DIAGNOSIS — S60.222A CONTUSION OF LEFT HAND, INITIAL ENCOUNTER: ICD-10-CM

## 2018-03-24 DIAGNOSIS — S80.211A ABRASION OF RIGHT KNEE, INITIAL ENCOUNTER: ICD-10-CM

## 2018-03-24 DIAGNOSIS — S60.419A ABRASION OF FINGER OF RIGHT HAND, INITIAL ENCOUNTER: ICD-10-CM

## 2018-03-24 DIAGNOSIS — S60.221A CONTUSION OF RIGHT HAND, INITIAL ENCOUNTER: ICD-10-CM

## 2018-03-24 DIAGNOSIS — S09.90XA CLOSED HEAD INJURY, INITIAL ENCOUNTER: ICD-10-CM

## 2018-03-24 DIAGNOSIS — S00.81XA ABRASION OF FACE, INITIAL ENCOUNTER: ICD-10-CM

## 2018-03-24 DIAGNOSIS — S80.01XA CONTUSION OF RIGHT KNEE, INITIAL ENCOUNTER: ICD-10-CM

## 2018-03-24 PROCEDURE — 90715 TDAP VACCINE 7 YRS/> IM: CPT | Performed by: EMERGENCY MEDICINE

## 2018-03-24 PROCEDURE — 73130 X-RAY EXAM OF HAND: CPT | Mod: RT

## 2018-03-24 PROCEDURE — 700111 HCHG RX REV CODE 636 W/ 250 OVERRIDE (IP): Performed by: EMERGENCY MEDICINE

## 2018-03-24 PROCEDURE — 72125 CT NECK SPINE W/O DYE: CPT

## 2018-03-24 PROCEDURE — 70450 CT HEAD/BRAIN W/O DYE: CPT

## 2018-03-24 PROCEDURE — 70486 CT MAXILLOFACIAL W/O DYE: CPT

## 2018-03-24 PROCEDURE — 73562 X-RAY EXAM OF KNEE 3: CPT | Mod: RT

## 2018-03-24 PROCEDURE — 99285 EMERGENCY DEPT VISIT HI MDM: CPT

## 2018-03-24 PROCEDURE — 73130 X-RAY EXAM OF HAND: CPT | Mod: LT

## 2018-03-24 PROCEDURE — 90471 IMMUNIZATION ADMIN: CPT

## 2018-03-24 RX ORDER — AMOXICILLIN 500 MG/1
500 CAPSULE ORAL 3 TIMES DAILY
Qty: 30 CAP | Refills: 0 | Status: SHIPPED | OUTPATIENT
Start: 2018-03-24

## 2018-03-24 RX ADMIN — CLOSTRIDIUM TETANI TOXOID ANTIGEN (FORMALDEHYDE INACTIVATED), CORYNEBACTERIUM DIPHTHERIAE TOXOID ANTIGEN (FORMALDEHYDE INACTIVATED), BORDETELLA PERTUSSIS TOXOID ANTIGEN (GLUTARALDEHYDE INACTIVATED), BORDETELLA PERTUSSIS FILAMENTOUS HEMAGGLUTININ ANTIGEN (FORMALDEHYDE INACTIVATED), BORDETELLA PERTUSSIS PERTACTIN ANTIGEN, AND BORDETELLA PERTUSSIS FIMBRIAE 2/3 ANTIGEN 0.5 ML: 5; 2; 2.5; 5; 3; 5 INJECTION, SUSPENSION INTRAMUSCULAR at 03:47

## 2018-03-24 ASSESSMENT — PAIN SCALES - GENERAL: PAINLEVEL_OUTOF10: 4

## 2018-03-24 NOTE — ED TRIAGE NOTES
Pt arrives in police custody with c/o injury during arrest. Pt reports pain to r cheek, r kneecap and ha after being tackled by police during arrest. Pt was also reportedly dry tased by police. Pt non cooperative, in handcuffs with police at bedside. Refusing to provide his name to er staff.

## 2018-03-24 NOTE — ED PROVIDER NOTES
"ED Provider Note    CHIEF COMPLAINT  Chief Complaint   Patient presents with   • Injury        HPI    Primary care provider: No primary care provider on file.   History obtained from: Patient and police  History limited by: Patient did not want to answer many questions    Kristina Weir is a 33 y.o. male who presents to the ED by police complaining of injuries while he was arrested. Police states that patient was trying to flee and they tackled the patient to the ground. Patient noted to have abrasion to the right facial cheek, right hand and right knee. There was no loss of consciousness per police. Patient reports headache, facial pain, sore neck, bilateral hand pain and right knee pain. He is unsure of his last tetanus shot. He admits to drinking alcohol tonight but refuses to say how much. He denies any drug use. He has no other complaints at this time except \"get me out of these fucking handcuffs.\"      REVIEW OF SYSTEMS  Please see HPI for pertinent positives/negatives.  All other systems reviewed and are negative.     PAST MEDICAL HISTORY  Past Medical History:   Diagnosis Date   • Anxiety    • Psychiatric disorder         SURGICAL HISTORY  History reviewed. No pertinent surgical history.     SOCIAL HISTORY  Social History     Social History Main Topics   • Smoking status: Current Some Day Smoker   • Smokeless tobacco: Never Used   • Alcohol use Yes      Comment: weekly   • Drug use: No   • Sexual activity: Not on file        FAMILY HISTORY  History reviewed. No pertinent family history.     CURRENT MEDICATIONS  Home Medications     Reviewed by Ivon Reynoso R.N. (Registered Nurse) on 03/24/18 at 0140  Med List Status: Complete   Medication Last Dose Status        Patient Aleksey Taking any Medications                        ALLERGIES  Allergies   Allergen Reactions   • Shellfish Allergy         PHYSICAL EXAM  VITAL SIGNS: /83   Pulse 95   Temp 37.1 °C (98.8 °F)   Resp 16   Ht 1.753 m (5' 9\")  "  Wt 68 kg (150 lb)   SpO2 92%   BMI 22.15 kg/m²  @[id number]::@     Pulse ox interpretation: 92% I interpret this pulse ox as normal     Constitutional: Well developed, well nourished, alert in no apparent distress, nontoxic appearance   HENT: Abrasion noted to right facial cheek with minimal swelling and tenderness to palpation without crepitus/step-off, normocephalic, bilateral external ears normal, no hemotympanum bilaterally, oropharynx moist and clear, airway patent, nose non TTP with no hematoma/bleeding/drainage, midface stable, no malocclusion, no periorbital swelling/bruising, no mastoid swelling/bruising   Eyes: PERRL, EOMI, conjunctiva without erythema, no discharge, no icterus   Neck: Soft and supple, trachea midline, no stridor, no swelling/bruising, mild diffuse tenderness to palpation posteriorly, no stepoffs, no LAD, no JVD, patient noted to be moving his neck without apparent restriction for discomfort  Cardiovascular: Regular rate and rhythm, no murmurs/rubs/gallops, strong distal pulses and good perfusion   Thorax & Lungs: No respiratory distress, CTAB with equal BS bilaterally, no chest tenderness  Abdomen: Soft, nontender, nondistended, no G/R, normal BS, pelvis stable   Back: No apparent tenderness to palpation  Extremities: No clubbing, no cyanosis, no edema, no gross deformity, small abrasion to right pinky finger without apparent tenderness to palpation and good active range of motion without apparent restriction or discomfort, abrasion to the right knee anteriorly with tenderness to palpation and good active range of motion without apparent restriction, intact distal pulses with brisk cap refill, sensation intact to touch throughout  Skin: Warm, dry, no pallor/cyanosis, no rash noted   Lymphatic: No lymphadenopathy noted   Neuro: Alert, GCS15, no focal deficits noted, sensation intact to touch, equal strength bilateral UE/LE   Psychiatric: Does not want to answer many questions, no signs  of active hallucinations or delusions         DIAGNOSTIC STUDIES / PROCEDURES        LABS  All labs reviewed by me.     No results found for this or any previous visit.     RADIOLOGY  The radiologist's interpretation of all radiological studies have been reviewed by me.     CT-CSPINE WITHOUT PLUS RECONS   Final Result      No acute fracture or traumatic subluxation.      CT-MAXILLOFACIAL W/O PLUS RECONS   Final Result      1.  Superior nasal spinal fractures, possibly old, due to the absence of adjacent soft tissue swelling.   2.  Right orbital floor deformity, also possibly old. Clinical correlation is recommended.   3.  Sinusitis.      CT-HEAD W/O   Final Result      Normal CT scan of the head without contrast.               INTERPRETING LOCATION:  [address], [place], [address]      DX-HAND 3+ LEFT   Final Result      Fifth metacarpal deformity, consistent with old fracture. Clinical correlation is recommended however.      DX-HAND 3+ RIGHT   Final Result      No acute bony abnormality.      DX-KNEE 3 VIEWS RIGHT   Final Result      No radiographic evidence of acute traumatic injury.             COURSE & MEDICAL DECISION MAKING  Nursing notes, VS, PMSFHx reviewed in chart.       Differential diagnoses considered include but are not limited to: Fx, contusion, strain, sprain, abrasion, concussion       Patient brought by police to the ED with above complaint. CT head, C-spine and facial bone with findings as above. Do not suspect acute fracture given the benign exam. X-rays of both hands and right knee with findings as above. Also do not suspect acute fracture given the benign exam. Patient's tetanus was updated in the ED. He will be prescribed amoxicillin for his sinusitis findings. He was otherwise noted to be in no acute distress and nontoxic in appearance. He will be discharged to police custody. He was advised on outpatient follow-up and given return to ED precautions. Patient with no further questions or  concerns at this time.      The patient is referred to a primary physician for blood pressure management, diabetic screening, and for all other preventative health concerns.       FINAL IMPRESSION  1. Abrasion of face, initial encounter    2. Closed head injury, initial encounter    3. Abrasion of finger of right hand, initial encounter    4. Contusion of right hand, initial encounter    5. Contusion of left hand, initial encounter    6. Abrasion of right knee, initial encounter    7. Contusion of right knee, initial encounter    8. Sinusitis, unspecified chronicity, unspecified location           DISPOSITION  Patient will be discharged to police custody in stable condition.       FOLLOW UP  [address] [address]  [phone]  Call in 2 days      [facility], Emergency Dept  [address] [facility] [id number] [phone]    If symptoms worsen         OUTPATIENT MEDICATIONS  Discharge Medication List as of 3/24/2018  3:49 AM      START taking these medications    Details   amoxicillin (AMOXIL) 500 MG Cap Take 1 Cap by mouth 3 times a day., Disp-30 Cap, R-0, Print Rx Paper                Electronically signed by: Marv Steiner, 3/24/2018 1:52 AM      Portions of this record were made with voice recognition software.  Despite my review, spelling/grammar/context errors may still remain.  Interpretation of this chart should be taken in this context.

## 2018-03-24 NOTE — ED NOTES
Pt in nad, resp equal and unlabored. Pt agreeable to tdap at d/c. [facility]. Pt in nad. ambulatory out of department with police. D/c instructions and rx reviewed with pt and police. Both verbalized understanding.

## 2018-03-24 NOTE — DISCHARGE INSTRUCTIONS
Abrasion  An abrasion is a cut or scrape on the outer surface of your skin. An abrasion does not extend through all of the layers of your skin. It is important to care for your abrasion properly to prevent infection.  What are the causes?  Most abrasions are caused by falling on or gliding across the ground or another surface. When your skin rubs on something, the outer and inner layer of skin rubs off.  What are the signs or symptoms?  A cut or scrape is the main symptom of this condition. The scrape may be bleeding, or it may appear red or pink. If there was an associated fall, there may be an underlying bruise.  How is this diagnosed?  An abrasion is diagnosed with a physical exam.  How is this treated?  Treatment for this condition depends on how large and deep the abrasion is. Usually, your abrasion will be cleaned with water and mild soap. This removes any dirt or debris that may be stuck. An antibiotic ointment may be applied to the abrasion to help prevent infection. A bandage (dressing) may be placed on the abrasion to keep it clean.  You may also need a tetanus shot.  Follow these instructions at home:  Medicines  · Take or apply medicines only as directed by your health care provider.  · If you were prescribed an antibiotic ointment, finish all of it even if you start to feel better.  Wound care  · Clean the wound with mild soap and water 2-3 times per day or as directed by your health care provider. Pat your wound dry with a clean towel. Do not rub it.  · There are many different ways to close and cover a wound. Follow instructions from your health care provider about:  ¨ Wound care.  ¨ Dressing changes and removal.  · Check your wound every day for signs of infection. Watch for:  ¨ Redness, swelling, or pain.  ¨ Fluid, blood, or pus.  General instructions  · Keep the dressing dry as directed by your health care provider. Do not take baths, swim, use a hot tub, or do anything that would put your wound  underwater until your health care provider approves.  · If there is swelling, raise (elevate) the injured area above the level of your heart while you are sitting or lying down.  · Keep all follow-up visits as directed by your health care provider. This is important.  Contact a health care provider if:  · You received a tetanus shot and you have swelling, severe pain, redness, or bleeding at the injection site.  · Your pain is not controlled with medicine.  · You have increased redness, swelling, or pain at the site of your wound.  Get help right away if:  · You have a red streak going away from your wound.  · You have a fever.  · You have fluid, blood, or pus coming from your wound.  · You notice a bad smell coming from your wound or your dressing.  This information is not intended to replace advice given to you by your health care provider. Make sure you discuss any questions you have with your health care provider.  Document Released: 09/27/2006 Document Revised: 08/18/2017 Document Reviewed: 12/16/2015  [employer] Interactive Patient Education © 2017 [employer]    Hand Injuries  Minor breaks (fractures), sprains, bruises (contusions), and burns of the hand are all examples of hand injuries. A fracture is a break in the bone. A sprain means that ligaments have been stretched or torn. A contusion is the result of an injury that caused bleeding under the skin. Burns are damage to the skin that occurs when the hand comes in contact with something very hot (or with certain chemicals).   HOME CARE INSTRUCTIONS  · For sprains, keep your hand raised (elevated) above the level of your heart. Do this until the pain and swelling improve.   · Use hand bandages (dressings) and splints to reduce motion, relieve pain, and prevent reinjury as directed. The dressing and splint should not be removed without your caregiver's approval.   · Put ice on the injured area to reduce pain and swelling due to fractures, sprains, and deep  bruises.   · Put ice in a plastic bag.   · Place a towel between your skin and the bag.   · Leave the ice on for 15-20 minutes, 3-4 times a day. Do this for 2 3 days.   · Only take over-the-counter or prescription medicines as directed by your caregiver.   · Follow up with your caregiver or a specialist as directed.   Early motion exercises are sometimes needed to reduce joint stiffness after a hand injury. However, your hand should not be used for any activities that increase pain.  SEEK MEDICAL CARE IF:  · Your pain or swelling does not improve in 2 3 days.   SEEK IMMEDIATE MEDICAL CARE IF:  · You have increased pain, swelling, or redness in your hand.   · Your pain is not controlled with medicine.   · You have a fever or persistent symptoms for more than 2 3 days.   · You have a fever and your symptoms suddenly get worse.   · You have pain when moving your fingers.   · You have pus coming from the wound.   · You have numbness in your fingers.   MAKE SURE YOU:  · Understand these instructions.   · Will watch your condition.   · Will get help right away if you are not doing well or get worse.   Document Released: 01/25/2006 Document Revised: 09/11/2013 Document Reviewed: 06/30/2013  [employer]® Patient Information ©2013 [employer].    Sinusitis, Adult  Sinusitis is soreness and inflammation of your sinuses. Sinuses are hollow spaces in the bones around your face. Your sinuses are located:  · Around your eyes.  · In the middle of your forehead.  · Behind your nose.  · In your cheekbones.  Your sinuses and nasal passages are lined with a stringy fluid (mucus). Mucus normally drains out of your sinuses. When your nasal tissues become inflamed or swollen, the mucus can become trapped or blocked so air cannot flow through your sinuses. This allows bacteria, viruses, and funguses to grow, which leads to infection.  Sinusitis can develop quickly and last for 7?10 days (acute) or for more than 12 weeks (chronic).  Sinusitis often develops after a cold.  What are the causes?  This condition is caused by anything that creates swelling in the sinuses or stops mucus from draining, including:  · Allergies.  · Asthma.  · Bacterial or viral infection.  · Abnormally shaped bones between the nasal passages.  · Nasal growths that contain mucus (nasal polyps).  · Narrow sinus openings.  · Pollutants, such as chemicals or irritants in the air.  · A foreign object stuck in the nose.  · A fungal infection. This is rare.  What increases the risk?  The following factors may make you more likely to develop this condition:  · Having allergies or asthma.  · Having had a recent cold or respiratory tract infection.  · Having structural deformities or blockages in your nose or sinuses.  · Having a weak immune system.  · Doing a lot of swimming or diving.  · Overusing nasal sprays.  · Smoking.  What are the signs or symptoms?  The main symptoms of this condition are pain and a feeling of pressure around the affected sinuses. Other symptoms include:  · Upper toothache.  · Earache.  · Headache.  · Bad breath.  · Decreased sense of smell and taste.  · A cough that may get worse at night.  · Fatigue.  · Fever.  · Thick drainage from your nose. The drainage is often green and it may contain pus (purulent).  · Stuffy nose or congestion.  · Postnasal drip. This is when extra mucus collects in the throat or back of the nose.  · Swelling and warmth over the affected sinuses.  · Sore throat.  · Sensitivity to light.  How is this diagnosed?  This condition is diagnosed based on symptoms, a medical history, and a physical exam. To find out if your condition is acute or chronic, your health care provider may:  · Look in your nose for signs of nasal polyps.  · Tap over the affected sinus to check for signs of infection.  · View the inside of your sinuses using an imaging device that has a light attached (endoscope).  If your health care provider suspects that  you have chronic sinusitis, you may also:  · Be tested for allergies.  · Have a sample of mucus taken from your nose (nasal culture) and checked for bacteria.  · Have a mucus sample examined to see if your sinusitis is related to an allergy.  If your sinusitis does not respond to treatment and it lasts longer than 8 weeks, you may have an MRI or CT scan to check your sinuses. These scans also help to determine how severe your infection is.  In rare cases, a bone biopsy may be done to rule out more serious types of fungal sinus disease.  How is this treated?  Treatment for sinusitis depends on the cause and whether your condition is chronic or acute. If a virus is causing your sinusitis, your symptoms will go away on their own within 10 days. You may be given medicines to relieve your symptoms, including:  · Topical nasal decongestants. They shrink swollen nasal passages and let mucus drain from your sinuses.  · Antihistamines. These drugs block inflammation that is triggered by allergies. This can help to ease swelling in your nose and sinuses.  · Topical nasal corticosteroids. These are nasal sprays that ease inflammation and swelling in your nose and sinuses.  · Nasal saline washes. These rinses can help to get rid of thick mucus in your nose.  If your condition is caused by bacteria, you will be given an antibiotic medicine. If your condition is caused by a fungus, you will be given an antifungal medicine.  Surgery may be needed to correct underlying conditions, such as narrow nasal passages. Surgery may also be needed to remove polyps.  Follow these instructions at home:  Medicines  · Take, use, or apply over-the-counter and prescription medicines only as told by your health care provider. These may include nasal sprays.  · If you were prescribed an antibiotic medicine, take it as told by your health care provider. Do not stop taking the antibiotic even if you start to feel better.  Hydrate and Humidify  · Drink  enough water to keep your urine clear or pale yellow. Staying hydrated will help to thin your mucus.  · Use a cool mist humidifier to keep the humidity level in your home above 50%.  · Inhale steam for 10-15 minutes, 3-4 times a day or as told by your health care provider. You can do this in the bathroom while a hot shower is running.  · Limit your exposure to cool or dry air.  Rest  · Rest as much as possible.  · Sleep with your head raised (elevated).  · Make sure to get enough sleep each night.  General instructions  · Apply a warm, moist washcloth to your face 3-4 times a day or as told by your health care provider. This will help with discomfort.  · Wash your hands often with soap and water to reduce your exposure to viruses and other germs. If soap and water are not available, use hand .  · Do not smoke. Avoid being around people who are smoking (secondhand smoke).  · Keep all follow-up visits as told by your health care provider. This is important.  Contact a health care provider if:  · You have a fever.  · Your symptoms get worse.  · Your symptoms do not improve within 10 days.  Get help right away if:  · You have a severe headache.  · You have persistent vomiting.  · You have pain or swelling around your face or eyes.  · You have vision problems.  · You develop confusion.  · Your neck is stiff.  · You have trouble breathing.  This information is not intended to replace advice given to you by your health care provider. Make sure you discuss any questions you have with your health care provider.  Document Released: 12/18/2006 Document Revised: 08/13/2017 Document Reviewed: 10/12/2016  [employer] Interactive Patient Education © 2017 [employer]

## 2019-02-15 NOTE — ED NOTES
Phlebotomy at bedside to obtain labs and patient refused to have labs drawn due to he was handcuffed to the bed and explained that we can still obtain labs while he is in handcuffs and the handcuffs couldn't be removed. Patient now agrees to have labs drawn.   Statement Selected

## 2023-07-21 ENCOUNTER — DOCUMENTATION (OUTPATIENT)
Dept: VASCULAR LAB | Facility: MEDICAL CENTER | Age: 38
End: 2023-07-21
Payer: OTHER GOVERNMENT

## 2023-07-21 NOTE — PROGRESS NOTES
Received referral from [facility] for Cardia clearance by mistake.  Faxed referral to [facility]. (Fx#[phone])    Scanned referral to chart.

## 2024-10-03 ENCOUNTER — HOSPITAL ENCOUNTER (OUTPATIENT)
Dept: LAB | Facility: MEDICAL CENTER | Age: 39
End: 2024-10-03
Payer: MEDICAID

## 2024-10-03 LAB
BASOPHILS # BLD AUTO: 0.5 % (ref 0–1.8)
BASOPHILS # BLD: 0.04 K/UL (ref 0–0.12)
EOSINOPHIL # BLD AUTO: 0.38 K/UL (ref 0–0.51)
EOSINOPHIL NFR BLD: 4.6 % (ref 0–6.9)
ERYTHROCYTE [DISTWIDTH] IN BLOOD BY AUTOMATED COUNT: 38.5 FL (ref 35.9–50)
EST. AVERAGE GLUCOSE BLD GHB EST-MCNC: 126 MG/DL
HBA1C MFR BLD: 6 % (ref 4–5.6)
HCT VFR BLD AUTO: 45.9 % (ref 42–52)
HGB BLD-MCNC: 15.1 G/DL (ref 14–18)
IMM GRANULOCYTES # BLD AUTO: 0.03 K/UL (ref 0–0.11)
IMM GRANULOCYTES NFR BLD AUTO: 0.4 % (ref 0–0.9)
LYMPHOCYTES # BLD AUTO: 2.84 K/UL (ref 1–4.8)
LYMPHOCYTES NFR BLD: 34.5 % (ref 22–41)
MCH RBC QN AUTO: 26.3 PG (ref 27–33)
MCHC RBC AUTO-ENTMCNC: 32.9 G/DL (ref 32.3–36.5)
MCV RBC AUTO: 80 FL (ref 81.4–97.8)
MONOCYTES # BLD AUTO: 0.7 K/UL (ref 0–0.85)
MONOCYTES NFR BLD AUTO: 8.5 % (ref 0–13.4)
NEUTROPHILS # BLD AUTO: 4.25 K/UL (ref 1.82–7.42)
NEUTROPHILS NFR BLD: 51.5 % (ref 44–72)
NRBC # BLD AUTO: 0 K/UL
NRBC BLD-RTO: 0 /100 WBC (ref 0–0.2)
PLATELET # BLD AUTO: 229 K/UL (ref 164–446)
PMV BLD AUTO: 10 FL (ref 9–12.9)
RBC # BLD AUTO: 5.74 M/UL (ref 4.7–6.1)
WBC # BLD AUTO: 8.2 K/UL (ref 4.8–10.8)

## 2024-10-03 PROCEDURE — 84439 ASSAY OF FREE THYROXINE: CPT

## 2024-10-03 PROCEDURE — 80053 COMPREHEN METABOLIC PANEL: CPT

## 2024-10-03 PROCEDURE — 83036 HEMOGLOBIN GLYCOSYLATED A1C: CPT

## 2024-10-03 PROCEDURE — 84481 FREE ASSAY (FT-3): CPT

## 2024-10-03 PROCEDURE — 82306 VITAMIN D 25 HYDROXY: CPT

## 2024-10-03 PROCEDURE — 36415 COLL VENOUS BLD VENIPUNCTURE: CPT

## 2024-10-03 PROCEDURE — 84443 ASSAY THYROID STIM HORMONE: CPT

## 2024-10-03 PROCEDURE — 85025 COMPLETE CBC W/AUTO DIFF WBC: CPT

## 2024-10-03 PROCEDURE — 80061 LIPID PANEL: CPT

## 2024-10-04 LAB
25(OH)D3 SERPL-MCNC: 14 NG/ML (ref 30–100)
ALBUMIN SERPL BCP-MCNC: 4.1 G/DL (ref 3.2–4.9)
ALBUMIN/GLOB SERPL: 1.6 G/DL
ALP SERPL-CCNC: 76 U/L (ref 30–99)
ALT SERPL-CCNC: 47 U/L (ref 2–50)
ANION GAP SERPL CALC-SCNC: 11 MMOL/L (ref 7–16)
AST SERPL-CCNC: 24 U/L (ref 12–45)
BILIRUB SERPL-MCNC: 0.8 MG/DL (ref 0.1–1.5)
BUN SERPL-MCNC: 13 MG/DL (ref 8–22)
CALCIUM ALBUM COR SERPL-MCNC: 8.6 MG/DL (ref 8.5–10.5)
CALCIUM SERPL-MCNC: 8.7 MG/DL (ref 8.5–10.5)
CHLORIDE SERPL-SCNC: 107 MMOL/L (ref 96–112)
CHOLEST SERPL-MCNC: 207 MG/DL (ref 100–199)
CO2 SERPL-SCNC: 22 MMOL/L (ref 20–33)
CREAT SERPL-MCNC: 0.61 MG/DL (ref 0.5–1.4)
FASTING STATUS PATIENT QL REPORTED: NORMAL
GFR SERPLBLD CREATININE-BSD FMLA CKD-EPI: 127 ML/MIN/1.73 M 2
GLOBULIN SER CALC-MCNC: 2.6 G/DL (ref 1.9–3.5)
GLUCOSE SERPL-MCNC: 125 MG/DL (ref 65–99)
HDLC SERPL-MCNC: 49 MG/DL
LDLC SERPL CALC-MCNC: 117 MG/DL
POTASSIUM SERPL-SCNC: 4 MMOL/L (ref 3.6–5.5)
PROT SERPL-MCNC: 6.7 G/DL (ref 6–8.2)
SODIUM SERPL-SCNC: 140 MMOL/L (ref 135–145)
T3FREE SERPL-MCNC: 3.79 PG/ML (ref 2–4.4)
T4 FREE SERPL-MCNC: 1.05 NG/DL (ref 0.93–1.7)
TRIGL SERPL-MCNC: 207 MG/DL (ref 0–149)
TSH SERPL-ACNC: 2.05 UIU/ML (ref 0.35–5.5)

## 2024-11-07 ENCOUNTER — NON-PROVIDER VISIT (OUTPATIENT)
Dept: OCCUPATIONAL MEDICINE | Facility: CLINIC | Age: 39
End: 2024-11-07

## 2024-11-07 DIAGNOSIS — Z11.1 ENCOUNTER FOR PPD TEST: Primary | ICD-10-CM

## 2024-11-07 PROCEDURE — 99999 PPD SKIN TEST: CPT | Performed by: NURSE PRACTITIONER

## 2024-11-09 ENCOUNTER — NON-PROVIDER VISIT (OUTPATIENT)
Dept: URGENT CARE | Facility: CLINIC | Age: 39
End: 2024-11-09
Payer: OTHER GOVERNMENT

## 2024-11-09 LAB — TB WHEAL 3D P 5 TU DIAM: 0 MM

## 2024-11-09 NOTE — PROGRESS NOTES
Haim Holliday is a 38 y.o. male here for a non-provider visit for PPD reading -- Step 1 of 1.      1.  Resulted in Epic under enter/edit results? Yes   2.  TB evaluation questionnaire scanned into chart and original given to patient?Yes      3. Was induration greater than 0 mm? No.    If Step 1 of 2, when is patient returning for second step (delete if N/A): N/A    Routed to PCP? No